# Patient Record
Sex: FEMALE | Race: WHITE | NOT HISPANIC OR LATINO | Employment: UNEMPLOYED | URBAN - NONMETROPOLITAN AREA
[De-identification: names, ages, dates, MRNs, and addresses within clinical notes are randomized per-mention and may not be internally consistent; named-entity substitution may affect disease eponyms.]

---

## 2019-02-13 ENCOUNTER — PROCEDURE VISIT (OUTPATIENT)
Dept: OBSTETRICS AND GYNECOLOGY | Facility: CLINIC | Age: 39
End: 2019-02-13

## 2019-02-13 VITALS
BODY MASS INDEX: 27.64 KG/M2 | DIASTOLIC BLOOD PRESSURE: 74 MMHG | WEIGHT: 172 LBS | SYSTOLIC BLOOD PRESSURE: 126 MMHG | HEIGHT: 66 IN

## 2019-02-13 DIAGNOSIS — N92.0 MENORRHAGIA WITH REGULAR CYCLE: ICD-10-CM

## 2019-02-13 DIAGNOSIS — N94.6 DYSMENORRHEA: ICD-10-CM

## 2019-02-13 DIAGNOSIS — N93.9 ABNORMAL UTERINE BLEEDING (AUB): Primary | ICD-10-CM

## 2019-02-13 DIAGNOSIS — Z12.4 SCREENING FOR MALIGNANT NEOPLASM OF CERVIX: ICD-10-CM

## 2019-02-13 DIAGNOSIS — Z13.1 SCREENING FOR DIABETES MELLITUS: ICD-10-CM

## 2019-02-13 LAB
HBA1C MFR BLD: 5.3 %
TSH SERPL DL<=0.005 MIU/L-ACNC: 2.5 MIU/ML (ref 0.47–4.68)

## 2019-02-13 PROCEDURE — 99204 OFFICE O/P NEW MOD 45 MIN: CPT | Performed by: OBSTETRICS & GYNECOLOGY

## 2019-02-13 PROCEDURE — 58100 BIOPSY OF UTERUS LINING: CPT | Performed by: OBSTETRICS & GYNECOLOGY

## 2019-02-13 RX ORDER — IBUPROFEN 800 MG/1
800 TABLET ORAL ONCE
Status: COMPLETED | OUTPATIENT
Start: 2019-02-13 | End: 2019-02-13

## 2019-02-13 RX ADMIN — IBUPROFEN 800 MG: 800 TABLET ORAL at 16:22

## 2019-02-13 NOTE — PROGRESS NOTES
Subjective   Chief Complaint   Patient presents with   • Gynecologic Exam     LMP: 2019, Last pap: 2011 WNL   • Menorrhagia     C/O heavy bleeding, has to wear super plus tampon and pads     Kalyn Wadsworth is a 38 y.o. year old  presenting to be seen for heavy vaginal bleeding.    She reports regular, monthly menses occurring every 25-28 days.  Her bleeding lasts for 6-7 days.  Her bleeding is heavy and painful.  She denies intermenstrual bleeding.  Her LMP was 19.  She will go through 2 boxes of super plus tampons and 2 boxes of pads every period.  She soaks towels, bed sheets and clothing with her bleeding.    She is currently staying at the [facility] where she is rehabbing from alcohol.  She's been sober for 4 months.  She does smoke half a pack per day and declines cessation at the moment.    She also reports a maternal grandmother who was diagnosed with ovarian cancer at age 62.    OB Hx: 2 term vaginal deliveries followed by 3 term  sections.  She underwent a bilateral tubal ligation with the last .  Pap smear:  - normal,  had colpo but no treatment  Mammogram: never  Colonoscopy: never  DEXA Scan: never    She denies nausea, emesis, fevers, chills, significant weight changes, hair/skin/nail changes, dysuria, urinary frequency, palpitations, chest pain, headaches, myalgia, dyspnea.     History  History reviewed. No pertinent past medical history., Past Surgical History:   Procedure Laterality Date   •  SECTION     , History reviewed. No pertinent family history., Social History     Tobacco Use   • Smoking status: Current Every Day Smoker     Packs/day: 0.50     Types: Cigarettes   • Smokeless tobacco: Never Used   Substance Use Topics   • Alcohol use: No     Frequency: Never   • Drug use: Not on file   ,   (Not in a hospital admission) and Allergies:  Patient has no known allergies.    Current Outpatient Medications on File Prior to Visit   Medication Sig  "Dispense Refill   • [DISCONTINUED] amoxicillin-clavulanate (AUGMENTIN) 875-125 MG per tablet Take 1 tablet by mouth 2 (Two) Times a Day. 20 tablet 0   • [DISCONTINUED] guaiFENesin (MUCINEX) 600 MG 12 hr tablet Take 2 tablets by mouth 2 (Two) Times a Day. 20 tablet 0   • [DISCONTINUED] MethylPREDNISolone (MEDROL, LAVINIA,) 4 MG tablet Take as directed on package instructions. 21 tablet 0     No current facility-administered medications on file prior to visit.        Social History    Tobacco Use      Smoking status: Current Every Day Smoker        Packs/day: 0.50        Types: Cigarettes      Smokeless tobacco: Never Used      Review of Systems  Pertinent items are noted in HPI, all other systems were reviewed and negative       Objective   /74   Ht 167.6 cm (66\")   Wt 78 kg (172 lb)   LMP 01/26/2019   BMI 27.76 kg/m²     Physical Exam:  General Appearance: alert, pleasant, appears stated age, interactive and cooperative  Head: normocephalic, without obvious abnormality and atraumatic  Eyes: lids and lashes normal and no icterus  Ears: ears appear intact with no abnormalities noted  Nose: nares normal, septum midline, mucosa normal and no drainage  Neck: suppple, trachea midline and no thyromegaly  Back: no kyphosis present, no scoliosis present and range of motion normal  Lungs: respirations regular, respirations even and respirations unlabored, clear to auscultation bilaterally   Heart: regular rhythm and normal rate, normal S1, S2, no murmur, gallop, or rubs and no click  Breasts: Not performed.  Abdomen: no masses, no hepatomegaly, no splenomegaly, soft non-tender, no guarding and no rebound tenderness  Extremities: moves extremities well, no edema, no cyanosis and no redness  Skin: no bleeding, bruising or rash and no lesions noted  Lymph Nodes: no palpable adenopathy  Neurologic: Cranial Nerves cranial nerves 2 - 12 grossly intact, Speech normal content and execusion, Coordination normal  Psych: normal " mood and affect, oriented to person, time and place, thought content organized and appropriate judgment    Pelvis:  Pelvic: Clinical staff was present for exam  External genitalia:  normal appearance of the external genitalia including Bartholin's and [place]'s glands.  :  urethral meatus normal;  Vagina:  normal pink mucosa without prolapse or lesions.  Cervix:  normal appearance. Very anterior and small.  Uterus:  Diffusely enlarged, nontender to palpation  Adnexa:  normal bimanual exam of the adnexa.  Rectal:  digital rectal exam not performed; anus visually normal appearing.    Review of Labs:   No data reviewed    Review of Imaging:  No data reviewed    Decision to obtain old records:  No.    Summarization of old records:  N/A    Independent review of image, tracing or specimen:  N/A       Assessment   Abnormal uterine bleeding  Menorrhagia with regular cycle  Dysmenorrhea  Age-appropriate health screening     Plan    Orders Placed This Encounter   Procedures   • TSH   • Hemoglobin A1c   • HCG, Serum, Qualitative     Standing Status:   Future     Standing Expiration Date:   2/13/2020   • CBC & Differential     Standing Status:   Future     Standing Expiration Date:   2/13/2020     Order Specific Question:   Manual Differential     Answer:   No     Medications ordered: none    Procedures performed: Pap smear, EMB    We reviewed her symptoms in detail today.  Additonial work-up is planned as listed above.    We discussed the importance of endometrial sampling to rule out malignancy and hyperplasia.  We discussed options for endometrial sampling and patient desires to move forward with in-office endometrial biopsy.    Endometrial Biopsy    Date of procedure: 02/13/2019  Indication:  Abnormal Uterine Bleeding             Informed Consent Obtained    Procedure documentation:    The patient was placed in the dorsal lithotomy position.  A speculum was placed in the vagina.  The cervix was prepped. The cervix was  grasped anterior at the 12 o'clock position.  The cavity sounded to 10 centimeters.  An endometrial biopsy specimen was obtained with multiple passes.  The tissue was sent for permanent histopathologic evaluation.  Tenaculum was removed from the cervix with scant bleeding.  The patient tolerated the procedure without any complications.    Post procedure instructions: She was instructed to call the office in 1 week if she has not heard from us otherwise.  If there is any significant fever, cramping, malodorous discharge, or heavy bleeding she is to call the office immediately or go to the ER.    We discussed possible treatment options with emphasis on the Mirena IUD, endometrial ablation and hysterectomy.  At present, the patient favors a hysterectomy but she is concerned about postoperative pain and her rehabilitation course with [facility].  She was given handouts and information for further consideration of these treatment options.  She will return in one week after her ultrasound to continue this discussion of treatment.    Follow up in 1 week after ultrasound    Luis Daniel Shook MD  Obstetrics and Gynecology  [facility]

## 2019-02-18 ENCOUNTER — RESULTS ENCOUNTER (OUTPATIENT)
Dept: OBSTETRICS AND GYNECOLOGY | Facility: CLINIC | Age: 39
End: 2019-02-18

## 2019-02-18 DIAGNOSIS — N92.0 MENORRHAGIA WITH REGULAR CYCLE: ICD-10-CM

## 2019-02-18 DIAGNOSIS — N93.9 ABNORMAL UTERINE BLEEDING (AUB): ICD-10-CM

## 2019-02-18 DIAGNOSIS — Z12.4 SCREENING FOR MALIGNANT NEOPLASM OF CERVIX: ICD-10-CM

## 2019-02-20 ENCOUNTER — OFFICE VISIT (OUTPATIENT)
Dept: OBSTETRICS AND GYNECOLOGY | Facility: CLINIC | Age: 39
End: 2019-02-20

## 2019-02-20 VITALS
SYSTOLIC BLOOD PRESSURE: 124 MMHG | DIASTOLIC BLOOD PRESSURE: 76 MMHG | HEIGHT: 66 IN | WEIGHT: 173 LBS | BODY MASS INDEX: 27.8 KG/M2

## 2019-02-20 DIAGNOSIS — N92.0 MENORRHAGIA WITH REGULAR CYCLE: ICD-10-CM

## 2019-02-20 DIAGNOSIS — N93.9 ABNORMAL UTERINE BLEEDING (AUB): Primary | ICD-10-CM

## 2019-02-20 DIAGNOSIS — N94.6 DYSMENORRHEA: ICD-10-CM

## 2019-02-20 DIAGNOSIS — Z30.014 ENCOUNTER FOR INITIAL PRESCRIPTION OF INTRAUTERINE CONTRACEPTIVE DEVICE (IUD): ICD-10-CM

## 2019-02-20 LAB
B-HCG SERPL QL: NORMAL
BASOPHILS # BLD AUTO: (no result) 10*3/UL
EOSINOPHIL # BLD AUTO: (no result) 10*3/UL
EOSINOPHIL NFR BLD AUTO: (no result) %
HCT VFR BLD AUTO: (no result) %
HGB BLD-MCNC: (no result) G/DL
LYMPHOCYTES # BLD AUTO: (no result) 10*3/UL
LYMPHOCYTES NFR BLD AUTO: (no result) %
Lab: NORMAL
MONOCYTES NFR BLD AUTO: (no result) %
NEUTROPHILS NFR BLD AUTO: (no result) %
PLATELET # BLD AUTO: (no result) 10*3/UL
RBC # BLD AUTO: (no result) 10*6/UL
REQUEST PROBLEM: NORMAL
WBC # BLD AUTO: (no result) 10*3/MM3
WRITTEN AUTHORIZATION: NORMAL

## 2019-02-20 PROCEDURE — 99214 OFFICE O/P EST MOD 30 MIN: CPT | Performed by: OBSTETRICS & GYNECOLOGY

## 2019-02-20 NOTE — PROGRESS NOTES
Subjective   Chief Complaint   Patient presents with   • Follow-up     TVS done today for Menorrhagia     Kalyn Wadsworth is a 38 y.o. year old  presenting to be seen for follow-up AUB.    She denies interval changes to her history.  She continues to experience heavy, monthly bleeding. Her bleeding lasts for 6-7 days.  Her bleeding is heavy and painful.  She denies intermenstrual bleeding.  Her LMP was 19.  She will go through 2 boxes of super plus tampons and 2 boxes of pads every period.  She soaks towels, bed sheets and clothing with her bleeding.    She presents today for a pelvic ultrasound and further discussion of workup and treatment.     After discussing proposed treatment options with [facility], they have recommended against surgical intervention secondary to need for postoperative pain medication and potential setbacks with rehabilitation.  She is interested in discussing medical options today.    She denies nausea, emesis, fevers, chills, significant weight changes, hair/skin/nail changes, dysuria, urinary frequency, palpitations, chest pain, headaches, myalgia, dyspnea.     History  History reviewed. No pertinent past medical history., Past Surgical History:   Procedure Laterality Date   •  SECTION     , History reviewed. No pertinent family history., Social History     Tobacco Use   • Smoking status: Current Every Day Smoker     Packs/day: 0.50     Types: Cigarettes   • Smokeless tobacco: Never Used   Substance Use Topics   • Alcohol use: No     Frequency: Never   • Drug use: Not on file   ,   (Not in a hospital admission) and Allergies:  Patient has no known allergies.    No current outpatient medications on file prior to visit.     No current facility-administered medications on file prior to visit.        Social History    Tobacco Use      Smoking status: Current Every Day Smoker        Packs/day: 0.50        Types: Cigarettes      Smokeless tobacco: Never Used      Review of  "Systems  Pertinent items are noted in HPI, all other systems were reviewed and negative       Objective   /76   Ht 167.6 cm (66\")   Wt 78.5 kg (173 lb)   LMP 01/26/2019   BMI 27.92 kg/m²     Physical Exam:  General Appearance: alert, pleasant, appears stated age, interactive and cooperative  Head: normocephalic, without obvious abnormality and atraumatic  Eyes: lids and lashes normal and no icterus  Ears: ears appear intact with no abnormalities noted  Nose: nares normal, septum midline, mucosa normal and no drainage  Neck: suppple, trachea midline and no thyromegaly  Back: no kyphosis present, no scoliosis present and range of motion normal  Lungs: respirations regular, respirations even and respirations unlabored, clear to auscultation bilaterally   Heart: regular rhythm and normal rate, normal S1, S2, no murmur, gallop, or rubs and no click  Breasts: Not performed.  Abdomen: no masses, no hepatomegaly, no splenomegaly, soft non-tender, no guarding and no rebound tenderness  Extremities: moves extremities well, no edema, no cyanosis and no redness  Skin: no bleeding, bruising or rash and no lesions noted  Lymph Nodes: no palpable adenopathy  Neurologic: Cranial Nerves cranial nerves 2 - 12 grossly intact, Speech normal content and execusion, Coordination normal  Psych: normal mood and affect, oriented to person, time and place, thought content organized and appropriate judgment      Review of Labs:   Labs, pap smear and EMB from 2/13    Review of Imaging:  No data reviewed    Decision to obtain old records:  No.    Summarization of old records:  N/A    Independent review of image, tracing or specimen:  A pelvic ultrasound was ordered and independently reviewed today. Normal sized, retroverted uterus with no obvious masses. Uterine length measures 8.9cm. Endometrium measures 8.6 mm.  Both ovaries have multiple small follicles and normal vascularity.  No free fluid in the cul-de-sac.       Assessment "   Abnormal uterine bleeding  Menorrhagia with regular cycle  Dysmenorrhea     Plan    Orders Placed This Encounter   Procedures   • US Non-ob Transvaginal     Order Specific Question:   Reason for Exam:     Answer:   Menorrhagia     Medications ordered: Mirena IUD    Procedures performed: None    We reviewed her symptoms and previous work-up in detail today.  We discussed potential treatment options.  Nicolle hearn is concerned about postoperative pain control and setbacks with rehabilitation.  They recommend attempting medical therapy.  Estrogen-containing therapies are contraindicated in her situation given her current smoking and age.  Of the progestin-only options, the Mirena IUD is most likely to improve her bleeding symptoms.  She desires to move forward with this device and it was ordered today.    Follow up in 4 weeks for IUD placement    Luis Daniel Shook MD  Obstetrics and Gynecology  [facility]

## 2019-02-27 ENCOUNTER — TELEPHONE (OUTPATIENT)
Dept: OBSTETRICS AND GYNECOLOGY | Facility: CLINIC | Age: 39
End: 2019-02-27